# Patient Record
Sex: FEMALE | Race: WHITE | Employment: OTHER | URBAN - METROPOLITAN AREA
[De-identification: names, ages, dates, MRNs, and addresses within clinical notes are randomized per-mention and may not be internally consistent; named-entity substitution may affect disease eponyms.]

---

## 2017-01-19 ENCOUNTER — TRANSFERRED RECORDS (OUTPATIENT)
Dept: HEALTH INFORMATION MANAGEMENT | Facility: CLINIC | Age: 47
End: 2017-01-19

## 2017-01-19 LAB — PHQ9 SCORE: 0

## 2017-03-21 ENCOUNTER — TRANSFERRED RECORDS (OUTPATIENT)
Dept: HEALTH INFORMATION MANAGEMENT | Facility: CLINIC | Age: 47
End: 2017-03-21

## 2017-05-18 ENCOUNTER — TRANSFERRED RECORDS (OUTPATIENT)
Dept: HEALTH INFORMATION MANAGEMENT | Facility: CLINIC | Age: 47
End: 2017-05-18

## 2017-05-18 LAB — PHQ9 SCORE: 0

## 2017-06-17 ENCOUNTER — HEALTH MAINTENANCE LETTER (OUTPATIENT)
Age: 47
End: 2017-06-17

## 2017-07-18 ENCOUNTER — TRANSFERRED RECORDS (OUTPATIENT)
Dept: HEALTH INFORMATION MANAGEMENT | Facility: CLINIC | Age: 47
End: 2017-07-18

## 2017-07-31 ENCOUNTER — TELEPHONE (OUTPATIENT)
Dept: ONCOLOGY | Facility: CLINIC | Age: 47
End: 2017-07-31

## 2017-07-31 NOTE — TELEPHONE ENCOUNTER
Left a msg with Ella to call clinic and schedule f/u appointment with Nkechi Green and then a Mammogram appointment to follow.  Janel ALCARAZ

## 2017-09-19 ENCOUNTER — TRANSFERRED RECORDS (OUTPATIENT)
Dept: HEALTH INFORMATION MANAGEMENT | Facility: CLINIC | Age: 47
End: 2017-09-19

## 2017-09-19 LAB — PHQ9 SCORE: 7

## 2017-10-09 DIAGNOSIS — R61 HYPERHIDROSIS: ICD-10-CM

## 2017-10-09 NOTE — TELEPHONE ENCOUNTER
glycopyrrolate 2 MG TABS      Last Written Prescription Date: 09/22/16  Last Fill Quantity: 90,  # refills: 11   Last Office Visit with G, [facility] or [facility] prescribing provider: 09/22/16

## 2017-10-10 RX ORDER — GLYCOPYRROLATE 2 MG/1
2-3 TABLET ORAL 2 TIMES DAILY PRN
Qty: 90 TABLET | Refills: 11 | Status: SHIPPED | OUTPATIENT
Start: 2017-10-10 | End: 2018-09-28

## 2017-10-10 NOTE — TELEPHONE ENCOUNTER
Routing refill request to provider for review/approval because:  Exceeds max dosing, please sign if ok.  Alona Funes, RN  Triage Nurse

## 2017-11-29 ENCOUNTER — OFFICE VISIT (OUTPATIENT)
Dept: PEDIATRICS | Facility: CLINIC | Age: 47
End: 2017-11-29
Payer: COMMERCIAL

## 2017-11-29 ENCOUNTER — TELEPHONE (OUTPATIENT)
Dept: PEDIATRICS | Facility: CLINIC | Age: 47
End: 2017-11-29

## 2017-11-29 VITALS
OXYGEN SATURATION: 98 % | HEART RATE: 92 BPM | HEIGHT: 64 IN | BODY MASS INDEX: 26.8 KG/M2 | WEIGHT: 156.97 LBS | SYSTOLIC BLOOD PRESSURE: 112 MMHG | TEMPERATURE: 98.3 F | DIASTOLIC BLOOD PRESSURE: 78 MMHG

## 2017-11-29 DIAGNOSIS — N10 ACUTE PYELONEPHRITIS: Primary | ICD-10-CM

## 2017-11-29 DIAGNOSIS — R39.9 SYMPTOMS INVOLVING URINARY SYSTEM: Primary | ICD-10-CM

## 2017-11-29 DIAGNOSIS — R10.31 ABDOMINAL PAIN, RIGHT LOWER QUADRANT: ICD-10-CM

## 2017-11-29 DIAGNOSIS — F32.1 MODERATE MAJOR DEPRESSION (H): ICD-10-CM

## 2017-11-29 DIAGNOSIS — R30.0 DYSURIA: ICD-10-CM

## 2017-11-29 DIAGNOSIS — R82.90 NONSPECIFIC FINDING ON EXAMINATION OF URINE: ICD-10-CM

## 2017-11-29 LAB
ALBUMIN UR-MCNC: NEGATIVE MG/DL
APPEARANCE UR: CLEAR
BACTERIA #/AREA URNS HPF: ABNORMAL /HPF
BILIRUB UR QL STRIP: NEGATIVE
COLOR UR AUTO: YELLOW
GLUCOSE UR STRIP-MCNC: NEGATIVE MG/DL
HGB UR QL STRIP: ABNORMAL
KETONES UR STRIP-MCNC: NEGATIVE MG/DL
LEUKOCYTE ESTERASE UR QL STRIP: ABNORMAL
MUCOUS THREADS #/AREA URNS LPF: PRESENT /LPF
NITRATE UR QL: NEGATIVE
NON-SQ EPI CELLS #/AREA URNS LPF: ABNORMAL /LPF
PH UR STRIP: 6 PH (ref 5–7)
RBC #/AREA URNS AUTO: ABNORMAL /HPF
SOURCE: ABNORMAL
SP GR UR STRIP: 1.02 (ref 1–1.03)
UROBILINOGEN UR STRIP-ACNC: 0.2 EU/DL (ref 0.2–1)
WBC #/AREA URNS AUTO: ABNORMAL /HPF

## 2017-11-29 PROCEDURE — 87086 URINE CULTURE/COLONY COUNT: CPT | Performed by: INTERNAL MEDICINE

## 2017-11-29 PROCEDURE — 81001 URINALYSIS AUTO W/SCOPE: CPT | Performed by: INTERNAL MEDICINE

## 2017-11-29 PROCEDURE — 99214 OFFICE O/P EST MOD 30 MIN: CPT | Performed by: INTERNAL MEDICINE

## 2017-11-29 RX ORDER — SULFAMETHOXAZOLE/TRIMETHOPRIM 800-160 MG
1 TABLET ORAL 2 TIMES DAILY
Qty: 14 TABLET | Refills: 0 | Status: SHIPPED | OUTPATIENT
Start: 2017-11-29 | End: 2017-12-06

## 2017-11-29 NOTE — LETTER
My Depression Action Plan  Name: Ella Reid   Date of Birth 1970  Date: 11/29/2017    My doctor: Irma Davies   My clinic: [address]  [address]  [facility] [address] [phone]          GREEN    ZONE   Good Control    What it looks like:     Things are going generally well. You have normal up s and down s. You may even feel depressed from time to time, but bad moods usually last less than a day.   What you need to do:  1. Continue to care for yourself (see self care plan)  2. Check your depression survival kit and update it as needed  3. Follow your physician s recommendations including any medication.  4. Do not stop taking medication unless you consult with your physician first.           YELLOW         ZONE Getting Worse    What it looks like:     Depression is starting to interfere with your life.     It may be hard to get out of bed; you may be starting to isolate yourself from others.    Symptoms of depression are starting to last most all day and this has happened for several days.     You may have suicidal thoughts but they are not constant.   What you need to do:     1. Call your care team, your response to treatment will improve if you keep your care team informed of your progress. Yellow periods are signs an adjustment may need to be made.     2. Continue your self-care, even if you have to fake it!    3. Talk to someone in your support network    4. Open up your depression survival kit           RED    ZONE Medical Alert - Get Help    What it looks like:     Depression is seriously interfering with your life.     You may experience these or other symptoms: You can t get out of bed most days, can t work or engage in other necessary activities, you have trouble taking care of basic hygiene, or basic responsibilities, thoughts of suicide or death that will not go away, self-injurious behavior.     What you need to do:  1. Call your  care team and request a same-day appointment. If they are not available (weekends or after hours) call your local crisis line, emergency room or 911.      Electronically signed by: Nay Pond, November 29, 2017    Depression Self Care Plan / Survival Kit    Self-Care for Depression  Here s the deal. Your body and mind are really not as separate as most people think.  What you do and think affects how you feel and how you feel influences what you do and think. This means if you do things that people who feel good do, it will help you feel better.  Sometimes this is all it takes.  There is also a place for medication and therapy depending on how severe your depression is, so be sure to consult with your medical provider and/ or Behavioral Health Consultant if your symptoms are worsening or not improving.     In order to better manage my stress, I will:    Exercise  Get some form of exercise, every day. This will help reduce pain and release endorphins, the  feel good  chemicals in your brain. This is almost as good as taking antidepressants!  This is not the same as joining a gym and then never going! (they count on that by the way ) It can be as simple as just going for a walk or doing some gardening, anything that will get you moving.      Hygiene   Maintain good hygiene (Get out of bed in the morning, Make your bed, Brush your teeth, Take a shower, and Get dressed like you were going to work, even if you are unemployed).  If your clothes don't fit try to get ones that do.    Diet  I will strive to eat foods that are good for me, drink plenty of water, and avoid excessive sugar, caffeine, alcohol, and other mood-altering substances.  Some foods that are helpful in depression are: complex carbohydrates, B vitamins, flaxseed, fish or fish oil, fresh fruits and vegetables.    Psychotherapy  I agree to participate in Individual Therapy (if recommended).    Medication  If prescribed medications, I agree to take  them.  Missing doses can result in serious side effects.  I understand that drinking alcohol, or other illicit drug use, may cause potential side effects.  I will not stop my medication abruptly without first discussing it with my provider.    Staying Connected With Others  I will stay in touch with my friends, family members, and my primary care provider/team.    Use your imagination  Be creative.  We all have a creative side; it doesn t matter if it s oil painting, sand castles, or mud pies! This will also kick up the endorphins.    Witness Beauty  (AKA stop and smell the roses) Take a look outside, even in mid-winter. Notice colors, textures. Watch the squirrels and birds.     Service to others  Be of service to others.  There is always someone else in need.  By helping others we can  get out of ourselves  and remember the really important things.  This also provides opportunities for practicing all the other parts of the program.    Humor  Laugh and be silly!  Adjust your TV habits for less news and crime-drama and more comedy.    Control your stress  Try breathing deep, massage therapy, biofeedback, and meditation. Find time to relax each day.     My support system    Clinic Contact:  Phone number:    Contact 1:  Phone number:    Contact 2:  Phone number:    Mandaen/:  Phone number:    Therapist:  Phone number:    Local crisis center:    Phone number:    Other community support:  Phone number:

## 2017-11-29 NOTE — MR AVS SNAPSHOT
"              After Visit Summary   11/29/2017    Ella Reid    MRN: [id number]           Patient Information     Date Of Birth          1970        Visit Information        Provider Department      11/29/2017 10:20 AM Paulina Stanley MD [facility]        Today's Diagnoses     Acute pyelonephritis    -  1    Abdominal pain, right lower quadrant        Moderate major depression (H)        Dysuria        Nonspecific finding on examination of urine           Follow-ups after your visit        Who to contact     If you have questions or need follow up information about today's clinic visit or your schedule please contact [facility] directly at [phone].  Normal or non-critical lab and imaging results will be communicated to you by MyChart, letter or phone within 4 business days after the clinic has received the results. If you do not hear from us within 7 days, please contact the clinic through [employer] or phone. If you have a critical or abnormal lab result, we will notify you by phone as soon as possible.  Submit refill requests through [employer] or call your pharmacy and they will forward the refill request to us. Please allow 3 business days for your refill to be completed.          Additional Information About Your Visit        MyChart Information     [employer] gives you secure access to your electronic health record. If you see a primary care provider, you can also send messages to your care team and make appointments. If you have questions, please call your primary care clinic.  If you do not have a primary care provider, please call [phone] and they will assist you.        Care EveryWhere ID     This is your Care EveryWhere ID. This could be used by other organizations to access your [facility] medical records  [id number]        Your Vitals Were     Pulse Temperature Height Last Period Pulse Oximetry BMI (Body Mass Index)    92 98.3  F (36.8  C) (Tympanic) 5' 4\" " (1.626 m) 07/28/2008 98% 26.94 kg/m2       Blood Pressure from Last 3 Encounters:   11/29/17 112/78   12/21/16 111/65   09/22/16 100/56    Weight from Last 3 Encounters:   11/29/17 156 lb 15.5 oz (71.2 kg)   12/21/16 141 lb (64 kg)   09/22/16 137 lb (62.1 kg)              We Performed the Following     *UA reflex to Microscopic and Culture ([place] [facility] (except [place] and [place] [employer])     DEPRESSION ACTION PLAN (DAP)     Urine Culture Aerobic Bacterial     Urine Microscopic        Primary Care Provider Office Phone # Fax #    Irma Davies [phone] [address] [place] [address]        Equal Access to Services     [facility] DAMI : Ene myers Solindsay, waaxda luqadaha, qaybta kaalmada adeegyavirginia, moose rodriguez . So [facility] [phone].    ATENCIÓN: Si habla español, tiene a cantu disposición servicios gratuitos de asistencia lingüística. Llame al [phone].    We comply with applicable federal civil rights laws and [place] laws. We do not discriminate on the basis of race, color, national origin, age, disability, sex, sexual orientation, or gender identity.            Thank you!     Thank you for choosing [facility]  for your care. Our goal is always to provide you with excellent care. Hearing back from our patients is one way we can continue to improve our services. Please take a few minutes to complete the written survey that you may receive in the mail after your visit with us. Thank you!             Your Updated Medication List - Protect others around you: Learn how to safely use, store and throw away your medicines at www.disposemymeds.org.          This list is accurate as of: 11/29/17 11:00 AM.  Always use your most recent med list.                   Brand Name Dispense Instructions for use Diagnosis    AMITIZA 24 MCG capsule   Generic drug:  lubiprostone      TAKE 1 CAPSULE BY ORAL ROUTE 2 TIMES EVERY DAY  WITH FOOD AND WATER        glycopyrrolate 2 MG Tabs     90 tablet    Take 2-3 mg by mouth 2 times daily as needed    Hyperhidrosis       HYDROcodone-acetaminophen  MG per tablet    NORCO     Take 1-2 tablets by mouth every 6 hours as needed (max 6 tabs/day)        magnesium hydroxide 400 MG/5ML suspension    MILK OF MAGNESIA    360 mL    Take 30-60 mLs by mouth daily as needed for constipation or heartburn        metaxalone 800 MG tablet    SKELAXIN     Take 800 mg by mouth 4 times daily as needed        * order for DME     1 Device    Equipment being ordered: wrist / thumb LT; [id number]    Wrist pain, left       * order for DME     1 Device    Equipment being ordered: umang mesa MD, Prostep; [id number]    Knee pain, left       OXYCONTIN 30 MG 12 hr tablet   Generic drug:  oxyCODONE      TAKE 1 TABLET BY ORAL ROUTE EVERY 12 HOURS -MAY FILL 4/24/16        senna-docusate 8.6-50 MG per tablet    SENOKOT-S;PERICOLACE    60 tablet    Take 1 tablet by mouth 2 times daily        TOPAMAX 50 MG tablet   Generic drug:  topiramate      Take 50 mg by mouth 2 times daily        traZODone 50 MG tablet    DESYREL    90 tablet    Take  mg by mouth nightly as needed        * Notice:  This list has 2 medication(s) that are the same as other medications prescribed for you. Read the directions carefully, and ask your doctor or other care provider to review them with you.

## 2017-11-29 NOTE — PROGRESS NOTES
"  SUBJECTIVE:   Ella Reid is a 47 year old female who presents to clinic today for the following health issues:    Patient reports to the clinic for the complaint of dysuria, frequency, urgency and odor. Sx started about 1 week ago; a few days ago she developed dry heaving, loss of appetite, increased back pain and \"feeling crummy\". She endorses low grade fevers 99.6; hot/ cold flashes and soaking sweats, abdominal pain, diarrhea every time she passes a BM; about 4-5 BM a day; sx started before last weekend. Denies hematozemia, black tar like stools. She has not been in contact with anyone sick and has not traveled outside of the country. Pt does have chronic back pain but feels that pain is worse and has been higher on her back than her typical pain.        URINARY TRACT SYMPTOMS      Duration: one week     Description  dysuria, frequency, urgency and odor    Intensity:  moderate    Accompanying signs and symptoms:  Fever/chills: no   Flank pain YES- with hx of back pain   Nausea and vomiting: YES  Vaginal symptoms: none  Abdominal/Pelvic Pain: YES    History  History of frequent UTI's: no   History of kidney stones: no   Sexually Active: YES  Possibility of pregnancy: No    Precipitating or alleviating factors: None    Therapies tried and outcome: none   Outcome: N/A            Problem list and histories reviewed & adjusted, as indicated.  Additional history: as documented    Patient Active Problem List   Diagnosis     Personal History of Anorexia Nervosa     CARDIOVASCULAR SCREENING; LDL GOAL LESS THAN 160     Basal cell carcinoma     Hyperhidrosis     GERD (gastroesophageal reflux disease)     Back pain     Moderate major depression (H)     Lump of breast     Pain in limb     Other complications due to other internal orthopedic device, implant, and graft     Lump or mass in breast     Dyspepsia and other specified disorders of function of stomach     Other specified dermatoses     Ovarian cyst, right     " Family history of malignant neoplasm of breast     Past Surgical History:   Procedure Laterality Date     CARPAL TUNNEL RELEASE RT/LT  , 10/09     CERVICECTOMY (TRACHELECTOMY)  6/15/11     HERNIA REPAIR      umbilical hernia     HYSTERECTOMY SUPRACERVICAL       HYSTERECTOMY, PAP STILL INDICATED      LSH; supracervical hysterectomy      INJECT STEROID (LOCATION) Bilateral 3/30/2015    Procedure: INJECT STEROID (LOCATION);  Surgeon: Laura Jasso DPM, Pod;  Location: [facility] OR     INSERT STIMULATOR AND LEADS INTERNAL DORSAL COLUMN N/A 10/13/2016    Procedure: INSERT STIMULATOR AND LEADS INTERNAL DORSAL COLUMN;  Surgeon: Del Yuen MD;  Location: [facility] OR     REMOVE HARDWARE FOOT Right 3/30/2015    Procedure: REMOVE HARDWARE FOOT;  Surgeon: Laura Jasso DPM, Pod;  Location: [facility] OR       Social History   Substance Use Topics     Smoking status: Former Smoker     Packs/day: 1.50     Years: 8.00     Quit date: 2000     Smokeless tobacco: Never Used     Alcohol use 0.0 oz/week     0 Standard drinks or equivalent per week      Comment: 1 glass wine per week     Family History   Problem Relation Age of Onset     Adopted: Yes     Depression Mother      CANCER Mother      age at diagnosis and type is unknown     Depression Father      Breast Cancer Maternal Grandmother 40      in 50s     DIABETES Maternal Grandfather      Depression Maternal Aunt      Breast Cancer Maternal Aunt 40      in 40s of unrelated causes         Current Outpatient Prescriptions   Medication Sig Dispense Refill     glycopyrrolate 2 MG TABS Take 2-3 mg by mouth 2 times daily as needed 90 tablet 11     AMITIZA 24 MCG capsule TAKE 1 CAPSULE BY ORAL ROUTE 2 TIMES EVERY DAY WITH FOOD AND WATER  1     OXYCONTIN 30 MG 12 hr tablet TAKE 1 TABLET BY ORAL ROUTE EVERY 12 HOURS -MAY FILL 16  0     HYDROcodone-acetaminophen (NORCO)  MG per tablet Take 1-2 tablets by mouth every 6 hours as needed (max 6 tabs/day)       topiramate  (TOPAMAX) 50 MG tablet Take 50 mg by mouth 2 times daily       senna-docusate (SENOKOT-S;PERICOLACE) 8.6-50 MG per tablet Take 1 tablet by mouth 2 times daily 60 tablet 1     magnesium hydroxide (MILK OF MAGNESIA) 400 MG/5ML suspension Take 30-60 mLs by mouth daily as needed for constipation or heartburn 360 mL 1     traZODone (DESYREL) 50 MG tablet Take  mg by mouth nightly as needed  90 tablet 3     metaxalone (SKELAXIN) 800 MG tablet Take 800 mg by mouth 4 times daily as needed   1     ORDER FOR DME Equipment being ordered: wrist / thumb LT; [id number] 1 Device 0     ORDER FOR DME Equipment being ordered: umang mesa MD, Prostep; [id number] 1 Device 0     Allergies   Allergen Reactions     Levaquin Swelling     Wrist tendons and achilles tendon     Cimetidine      loss of muscle control face and fingers-tagamet     Erythromycin      vomiting     Morphine      migraines     Tagamet      Loss of muscle control face and fingers     BP Readings from Last 3 Encounters:   11/29/17 112/78   12/21/16 111/65   09/22/16 100/56    Wt Readings from Last 3 Encounters:   11/29/17 71.2 kg (156 lb 15.5 oz)   12/21/16 64 kg (141 lb)   09/22/16 62.1 kg (137 lb)                        Reviewed and updated as needed this visit by clinical staffTobacco  Allergies  Meds  Med Hx  Surg Hx  Fam Hx  Soc Hx      Reviewed and updated as needed this visit by Provider         ROS:  Constitutional, HEENT, cardiovascular, pulmonary, gi and gu systems are negative, except as otherwise noted.    SEE HPI ABOVE     This document serves as a record of the services and decisions personally performed and made by Paulina Stanley MD. It was created on her behalf by Radha Chamberlain, a trained medical scribe. The creation of this document is based the provider's statements to the medical scribe.    Radha Chamberlain November 29, 2017 10:43 AM  OBJECTIVE:   /78 (BP Location: Right arm, Patient Position: Sitting, Cuff Size: Adult  "Regular)  Pulse 92  Temp 98.3  F (36.8  C) (Tympanic)  Ht 1.626 m (5' 4\")  Wt 71.2 kg (156 lb 15.5 oz)  LMP 07/28/2008  SpO2 98%  BMI 26.94 kg/m2  Body mass index is 26.94 kg/(m^2).  GENERAL: alert and no distress  RESP: lungs clear to auscultation - no rales, rhonchi or wheezes  CV: regular rate and rhythm, normal S1 S2, no S3 or S4, no murmur, click or rub  ABDOMEN: BS+ soft.  Tender RLQ, RUQ, no rebound or guarding.    BACK: + CVA tenderness bilaterally.    PSYCH: mentation appears normal, affect normal/bright      Diagnostic Test Results:  Results for orders placed or performed in visit on 11/29/17 (from the past 24 hour(s))   *UA reflex to Microscopic and Culture ([place] [facility] (except [place] and [place])   Result Value Ref Range    Color Urine Yellow     Appearance Urine Clear     Glucose Urine Negative NEG^Negative mg/dL    Bilirubin Urine Negative NEG^Negative    Ketones Urine Negative NEG^Negative mg/dL    Specific Gravity Urine 1.020 1.003 - 1.035    Blood Urine Trace (A) NEG^Negative    pH Urine 6.0 5.0 - 7.0 pH    Protein Albumin Urine Negative NEG^Negative mg/dL    Urobilinogen Urine 0.2 0.2 - 1.0 EU/dL    Nitrite Urine Negative NEG^Negative    Leukocyte Esterase Urine Large (A) NEG^Negative    Source Midstream Urine    Urine Microscopic   Result Value Ref Range    WBC Urine  (A) OTO2^O - 2 /HPF    RBC Urine 2-5 (A) OTO2^O - 2 /HPF    Squamous Epithelial /LPF Urine Many (A) FEW^Few /LPF    Bacteria Urine Many (A) NEG^Negative /HPF    Mucous Urine Present (A) NEG^Negative /LPF       ASSESSMENT/PLAN:   (N10) Acute pyelonephritis  (primary encounter diagnosis)  -UA +, with back pain and sweats I'm concerned this is pyelo  -will treat with bactrim BID x 7 days; await culture results   -if not improving in next 1-2 days should be seen again  -if worsening (fever greater than 100.5, worsening pain) or cannot keep down medications will need to go to the ER      (R10.31) Abdominal " pain, right lower quadrant  -discussed with patient that it is possible that she has more than one issue causing symptoms  -if worsening would recommend she go to the ER for imaging  Plan: Urine Microscopic        (F32.1) Moderate major depression (H)  Plan: DEPRESSION ACTION PLAN (DAP)       (R30.0) Dysuria  Plan: *UA reflex to Microscopic and Culture ([place]         [facility] (except [place] and         [place])      (R82.90) Nonspecific finding on examination of urine  Plan: Urine Culture Aerobic Bacterial        Follow up if symptoms are not improving or worsening       The information in this document, created by the medical scribe for me, accurately reflects the services I personally performed and the decisions made by me. I have reviewed and approved this document for accuracy prior to leaving the patient care area.  Paulina Stanley MD  [facility]

## 2017-11-29 NOTE — TELEPHONE ENCOUNTER
"Patient calls.  At the pharmacy-asking if medication was sent for her.    Per office visit today: \"will treat with bactrim BID x 7 days; await culture results\"    Huddled with pantera Weathers to send.  Yoko Jordan RN  Message handled by Nurse Triage.    "

## 2017-11-30 LAB
BACTERIA SPEC CULT: NORMAL
SPECIMEN SOURCE: NORMAL

## 2018-03-15 ENCOUNTER — TRANSFERRED RECORDS (OUTPATIENT)
Dept: HEALTH INFORMATION MANAGEMENT | Facility: CLINIC | Age: 48
End: 2018-03-15

## 2018-05-15 ENCOUNTER — TRANSFERRED RECORDS (OUTPATIENT)
Dept: HEALTH INFORMATION MANAGEMENT | Facility: CLINIC | Age: 48
End: 2018-05-15

## 2018-05-15 LAB — PHQ9 SCORE: 0

## 2018-05-25 ENCOUNTER — TELEPHONE (OUTPATIENT)
Dept: PEDIATRICS | Facility: CLINIC | Age: 48
End: 2018-05-25

## 2018-05-25 NOTE — LETTER
July 26, 2018      Ella Reid  [address] [place] [address]        Dear Ella,       We care about your health and have reviewed your health plan including your medical conditions, medications, and lab results.  Based on this review, it is recommended that you follow up regarding the following health topic(s):  -Depression  -Cervical Cancer Screening    We recommend you take the following action(s):  -schedule a PAP SMEAR EXAM which is due.  Please disregard this reminder if you have had this exam elsewhere within the last year.  It would be helpful for us to have a copy of your recent pap smear report to update your records.  -Complete and return the attached PHQ-9 Form.  If your total score is greater than 9, please schedule a followup appointment.  If you answer Yes to question 9, call your clinic between the hours of 8 to 5.  You may also call the Suicide Hotline at [phone] ([phone]) any time.     Please call us at the [facility] - [phone] (or use [employer]) to address the above recommendations.     Thank you for trusting [facility] and we appreciate the opportunity to serve you.  We look forward to supporting your healthcare needs in the future.    Healthy Regards,    Your Health Care Team  [facility]           none

## 2018-05-25 NOTE — TELEPHONE ENCOUNTER
Panel Management Review      Patient has the following on her problem list:     Depression / Dysthymia review    Measure:  Needs PHQ-9 score of 4 or less during index window.  Administer PHQ-9 and if score is 5 or more, send encounter to provider for next steps.    5 - 7 month window range: 0    PHQ-9 SCORE 9/10/2013 3/26/2015 9/22/2016   Total Score 0 0 -   Total Score - - 0       If PHQ-9 recheck is 5 or more, route to provider for next steps.    Patient is due for:  PHQ9      Composite cancer screening  Chart review shows that this patient is due/due soon for the following Pap Smear  Summary:    Patient is due/failing the following:   PAP and PHQ9    Action needed:   Patient needs office visit for pap smear. and Patient needs to do PHQ9.    Type of outreach:    Sent [employer] message.    Questions for provider review:    None                                                                                                                                    Nay Pond CMA ([facility])        Chart routed to Care Team .

## 2018-07-12 ENCOUNTER — TRANSFERRED RECORDS (OUTPATIENT)
Dept: HEALTH INFORMATION MANAGEMENT | Facility: CLINIC | Age: 48
End: 2018-07-12

## 2018-07-12 LAB — PHQ9 SCORE: 0

## 2018-07-26 NOTE — TELEPHONE ENCOUNTER
Sent letter to pt to schedule with PHQ-9.     Nay Pond CMA ([facility])

## 2018-11-08 ENCOUNTER — TRANSFERRED RECORDS (OUTPATIENT)
Dept: HEALTH INFORMATION MANAGEMENT | Facility: CLINIC | Age: 48
End: 2018-11-08

## 2018-11-08 LAB — PHQ9 SCORE: 0

## 2019-07-31 ENCOUNTER — TRANSFERRED RECORDS (OUTPATIENT)
Dept: HEALTH INFORMATION MANAGEMENT | Facility: CLINIC | Age: 49
End: 2019-07-31

## 2019-07-31 LAB — PHQ9 SCORE: 0

## 2019-11-10 ENCOUNTER — APPOINTMENT (OUTPATIENT)
Dept: GENERAL RADIOLOGY | Facility: CLINIC | Age: 49
End: 2019-11-10
Attending: NURSE PRACTITIONER

## 2019-11-10 ENCOUNTER — APPOINTMENT (OUTPATIENT)
Dept: CT IMAGING | Facility: CLINIC | Age: 49
End: 2019-11-10
Attending: NURSE PRACTITIONER

## 2019-11-10 ENCOUNTER — HOSPITAL ENCOUNTER (EMERGENCY)
Facility: CLINIC | Age: 49
Discharge: HOME | End: 2019-11-10
Attending: EMERGENCY MEDICINE | Admitting: EMERGENCY MEDICINE

## 2019-11-10 VITALS
OXYGEN SATURATION: 98 % | HEART RATE: 80 BPM | RESPIRATION RATE: 18 BRPM | TEMPERATURE: 99.2 F | DIASTOLIC BLOOD PRESSURE: 76 MMHG | SYSTOLIC BLOOD PRESSURE: 117 MMHG
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
## 2019-11-10 PROCEDURE — 25000132 ZZH RX MED GY IP 250 OP 250 PS 637: Performed by: EMERGENCY MEDICINE

## 2019-11-10 PROCEDURE — 25000132 ZZH RX MED GY IP 250 OP 250 PS 637: Performed by: NURSE PRACTITIONER

## 2019-11-10 PROCEDURE — 72125 CT NECK SPINE W/O DYE: CPT

## 2019-11-10 PROCEDURE — 72100 X-RAY EXAM L-S SPINE 2/3 VWS: CPT

## 2019-11-10 PROCEDURE — 72072 X-RAY EXAM THORAC SPINE 3VWS: CPT

## 2019-11-10 PROCEDURE — 99285 EMERGENCY DEPT VISIT HI MDM: CPT | Mod: 25

## 2019-11-10 RX ORDER — IBUPROFEN 600 MG/1
600 TABLET, FILM COATED ORAL ONCE
Status: COMPLETED | OUTPATIENT
Start: 2019-11-10 | End: 2019-11-10

## 2019-11-10 RX ORDER — HYDROCODONE BITARTRATE AND ACETAMINOPHEN 5; 325 MG/1; MG/1
2 TABLET ORAL ONCE
Status: COMPLETED | OUTPATIENT
Start: 2019-11-10 | End: 2019-11-10

## 2019-11-10 RX ORDER — DIAZEPAM 5 MG
5 TABLET ORAL ONCE
Status: COMPLETED | OUTPATIENT
Start: 2019-11-10 | End: 2019-11-10

## 2019-11-10 RX ORDER — IBUPROFEN 600 MG/1
600 TABLET, FILM COATED ORAL EVERY 6 HOURS PRN
Qty: 28 TABLET | Refills: 0 | Status: SHIPPED | OUTPATIENT
Start: 2019-11-10 | End: 2019-11-17

## 2019-11-10 RX ADMIN — DIAZEPAM 5 MG: 5 TABLET ORAL at 21:08

## 2019-11-10 RX ADMIN — HYDROCODONE BITARTRATE AND ACETAMINOPHEN 2 TABLET: 5; 325 TABLET ORAL at 18:33

## 2019-11-10 RX ADMIN — IBUPROFEN 600 MG: 600 TABLET, FILM COATED ORAL at 21:07

## 2019-11-10 ASSESSMENT — ENCOUNTER SYMPTOMS
HEADACHES: 1
NECK PAIN: 1
BACK PAIN: 1
ABDOMINAL PAIN: 0
SHORTNESS OF BREATH: 0
PALPITATIONS: 0
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
## 2019-11-10 NOTE — ED AVS SNAPSHOT
[facility] Emergency Department  [address]  [facility] [address]  Phone:  [phone]  Fax:  [phone]                                    Ella Reid   MRN: [id number]    Department:  [facility] Emergency Department   Date of Visit:  11/10/2019           After Visit Summary Signature Page    I have received my discharge instructions, and my questions have been answered. I have discussed any challenges I see with this plan with the nurse or doctor.    ..........................................................................................................................................  Patient/Patient Representative Signature      ..........................................................................................................................................  Patient Representative Print Name and Relationship to Patient    ..................................................               ................................................  Date                                   Time    ..........................................................................................................................................  Reviewed by Signature/Title    ...................................................              ..............................................  Date                                               Time          22EPIC Rev 08/18
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
## 2019-11-11 NOTE — DISCHARGE INSTRUCTIONS
Use ice on sore areas for the first 24 to 48 hours.  After 48 hours and use heat if you find it helpful.  Take ibuprofen every 6 hours with food.
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
## 2019-12-15 ENCOUNTER — HEALTH MAINTENANCE LETTER (OUTPATIENT)
Age: 49
End: 2019-12-15

## 2020-12-15 ENCOUNTER — TRANSFERRED RECORDS (OUTPATIENT)
Dept: HEALTH INFORMATION MANAGEMENT | Facility: CLINIC | Age: 50
End: 2020-12-15

## 2021-01-13 ENCOUNTER — TRANSFERRED RECORDS (OUTPATIENT)
Dept: HEALTH INFORMATION MANAGEMENT | Facility: CLINIC | Age: 51
End: 2021-01-13

## 2021-03-31 ENCOUNTER — TRANSFERRED RECORDS (OUTPATIENT)
Dept: HEALTH INFORMATION MANAGEMENT | Facility: CLINIC | Age: 51
End: 2021-03-31

## 2021-04-30 ENCOUNTER — TRANSFERRED RECORDS (OUTPATIENT)
Dept: HEALTH INFORMATION MANAGEMENT | Facility: CLINIC | Age: 51
End: 2021-04-30

## 2021-05-27 ENCOUNTER — TRANSFERRED RECORDS (OUTPATIENT)
Dept: HEALTH INFORMATION MANAGEMENT | Facility: CLINIC | Age: 51
End: 2021-05-27

## 2021-06-28 ENCOUNTER — TRANSFERRED RECORDS (OUTPATIENT)
Dept: HEALTH INFORMATION MANAGEMENT | Facility: CLINIC | Age: 51
End: 2021-06-28

## 2021-07-27 ENCOUNTER — TRANSFERRED RECORDS (OUTPATIENT)
Dept: HEALTH INFORMATION MANAGEMENT | Facility: CLINIC | Age: 51
End: 2021-07-27

## 2021-08-25 ENCOUNTER — TRANSFERRED RECORDS (OUTPATIENT)
Dept: HEALTH INFORMATION MANAGEMENT | Facility: CLINIC | Age: 51
End: 2021-08-25

## 2021-09-19 ENCOUNTER — TRANSFERRED RECORDS (OUTPATIENT)
Dept: HEALTH INFORMATION MANAGEMENT | Facility: CLINIC | Age: 51
End: 2021-09-19

## 2021-09-23 ENCOUNTER — TRANSFERRED RECORDS (OUTPATIENT)
Dept: HEALTH INFORMATION MANAGEMENT | Facility: CLINIC | Age: 51
End: 2021-09-23

## 2021-10-25 ENCOUNTER — TRANSFERRED RECORDS (OUTPATIENT)
Dept: HEALTH INFORMATION MANAGEMENT | Facility: CLINIC | Age: 51
End: 2021-10-25

## 2021-11-23 ENCOUNTER — TRANSFERRED RECORDS (OUTPATIENT)
Dept: HEALTH INFORMATION MANAGEMENT | Facility: CLINIC | Age: 51
End: 2021-11-23

## 2021-12-22 ENCOUNTER — TRANSFERRED RECORDS (OUTPATIENT)
Dept: HEALTH INFORMATION MANAGEMENT | Facility: CLINIC | Age: 51
End: 2021-12-22

## 2022-01-25 ENCOUNTER — TRANSFERRED RECORDS (OUTPATIENT)
Dept: HEALTH INFORMATION MANAGEMENT | Facility: CLINIC | Age: 52
End: 2022-01-25

## 2022-02-24 ENCOUNTER — TRANSFERRED RECORDS (OUTPATIENT)
Dept: HEALTH INFORMATION MANAGEMENT | Facility: CLINIC | Age: 52
End: 2022-02-24

## 2022-03-25 ENCOUNTER — TRANSFERRED RECORDS (OUTPATIENT)
Dept: HEALTH INFORMATION MANAGEMENT | Facility: CLINIC | Age: 52
End: 2022-03-25

## 2022-04-22 ENCOUNTER — TRANSFERRED RECORDS (OUTPATIENT)
Dept: HEALTH INFORMATION MANAGEMENT | Facility: CLINIC | Age: 52
End: 2022-04-22

## 2022-05-24 ENCOUNTER — TRANSFERRED RECORDS (OUTPATIENT)
Dept: HEALTH INFORMATION MANAGEMENT | Facility: CLINIC | Age: 52
End: 2022-05-24

## 2022-06-23 ENCOUNTER — TRANSFERRED RECORDS (OUTPATIENT)
Dept: HEALTH INFORMATION MANAGEMENT | Facility: CLINIC | Age: 52
End: 2022-06-23

## 2022-07-22 ENCOUNTER — TRANSFERRED RECORDS (OUTPATIENT)
Dept: HEALTH INFORMATION MANAGEMENT | Facility: CLINIC | Age: 52
End: 2022-07-22

## 2022-08-16 ENCOUNTER — TRANSFERRED RECORDS (OUTPATIENT)
Dept: HEALTH INFORMATION MANAGEMENT | Facility: CLINIC | Age: 52
End: 2022-08-16

## 2022-10-20 ENCOUNTER — TRANSFERRED RECORDS (OUTPATIENT)
Dept: HEALTH INFORMATION MANAGEMENT | Facility: CLINIC | Age: 52
End: 2022-10-20

## 2022-11-21 ENCOUNTER — TRANSFERRED RECORDS (OUTPATIENT)
Dept: HEALTH INFORMATION MANAGEMENT | Facility: CLINIC | Age: 52
End: 2022-11-21

## 2022-12-21 ENCOUNTER — TRANSFERRED RECORDS (OUTPATIENT)
Dept: HEALTH INFORMATION MANAGEMENT | Facility: CLINIC | Age: 52
End: 2022-12-21

## 2023-03-17 ENCOUNTER — TRANSFERRED RECORDS (OUTPATIENT)
Dept: HEALTH INFORMATION MANAGEMENT | Facility: CLINIC | Age: 53
End: 2023-03-17

## 2023-04-18 ENCOUNTER — TRANSFERRED RECORDS (OUTPATIENT)
Dept: HEALTH INFORMATION MANAGEMENT | Facility: CLINIC | Age: 53
End: 2023-04-18

## 2023-05-22 ENCOUNTER — TRANSFERRED RECORDS (OUTPATIENT)
Dept: HEALTH INFORMATION MANAGEMENT | Facility: CLINIC | Age: 53
End: 2023-05-22

## 2023-06-20 ENCOUNTER — TRANSFERRED RECORDS (OUTPATIENT)
Dept: HEALTH INFORMATION MANAGEMENT | Facility: CLINIC | Age: 53
End: 2023-06-20

## 2023-07-18 ENCOUNTER — TRANSFERRED RECORDS (OUTPATIENT)
Dept: HEALTH INFORMATION MANAGEMENT | Facility: CLINIC | Age: 53
End: 2023-07-18

## 2023-09-14 ENCOUNTER — TRANSFERRED RECORDS (OUTPATIENT)
Dept: HEALTH INFORMATION MANAGEMENT | Facility: CLINIC | Age: 53
End: 2023-09-14

## 2023-10-17 ENCOUNTER — TRANSFERRED RECORDS (OUTPATIENT)
Dept: HEALTH INFORMATION MANAGEMENT | Facility: CLINIC | Age: 53
End: 2023-10-17

## 2023-11-15 ENCOUNTER — TRANSFERRED RECORDS (OUTPATIENT)
Dept: HEALTH INFORMATION MANAGEMENT | Facility: CLINIC | Age: 53
End: 2023-11-15

## 2023-12-14 ENCOUNTER — TRANSFERRED RECORDS (OUTPATIENT)
Dept: HEALTH INFORMATION MANAGEMENT | Facility: CLINIC | Age: 53
End: 2023-12-14

## 2024-01-09 ENCOUNTER — TRANSFERRED RECORDS (OUTPATIENT)
Dept: HEALTH INFORMATION MANAGEMENT | Facility: CLINIC | Age: 54
End: 2024-01-09

## 2024-02-09 ENCOUNTER — TRANSFERRED RECORDS (OUTPATIENT)
Dept: HEALTH INFORMATION MANAGEMENT | Facility: CLINIC | Age: 54
End: 2024-02-09

## 2024-03-12 ENCOUNTER — TRANSFERRED RECORDS (OUTPATIENT)
Dept: HEALTH INFORMATION MANAGEMENT | Facility: CLINIC | Age: 54
End: 2024-03-12

## 2024-04-16 ENCOUNTER — TRANSFERRED RECORDS (OUTPATIENT)
Dept: HEALTH INFORMATION MANAGEMENT | Facility: CLINIC | Age: 54
End: 2024-04-16

## 2024-05-17 ENCOUNTER — TRANSFERRED RECORDS (OUTPATIENT)
Dept: HEALTH INFORMATION MANAGEMENT | Facility: CLINIC | Age: 54
End: 2024-05-17

## 2024-06-14 ENCOUNTER — TRANSFERRED RECORDS (OUTPATIENT)
Dept: HEALTH INFORMATION MANAGEMENT | Facility: CLINIC | Age: 54
End: 2024-06-14

## 2024-08-13 ENCOUNTER — TRANSFERRED RECORDS (OUTPATIENT)
Dept: HEALTH INFORMATION MANAGEMENT | Facility: CLINIC | Age: 54
End: 2024-08-13

## 2024-10-16 ENCOUNTER — TRANSFERRED RECORDS (OUTPATIENT)
Dept: HEALTH INFORMATION MANAGEMENT | Facility: CLINIC | Age: 54
End: 2024-10-16

## 2024-12-12 ENCOUNTER — TRANSFERRED RECORDS (OUTPATIENT)
Dept: HEALTH INFORMATION MANAGEMENT | Facility: CLINIC | Age: 54
End: 2024-12-12

## 2025-01-10 ENCOUNTER — TRANSFERRED RECORDS (OUTPATIENT)
Dept: HEALTH INFORMATION MANAGEMENT | Facility: CLINIC | Age: 55
End: 2025-01-10

## 2025-02-11 ENCOUNTER — TRANSFERRED RECORDS (OUTPATIENT)
Dept: HEALTH INFORMATION MANAGEMENT | Facility: CLINIC | Age: 55
End: 2025-02-11

## 2025-03-23 ENCOUNTER — ANCILLARY PROCEDURE (OUTPATIENT)
Dept: GENERAL RADIOLOGY | Facility: CLINIC | Age: 55
End: 2025-03-23
Attending: NURSE PRACTITIONER
Payer: COMMERCIAL

## 2025-03-23 ENCOUNTER — TRANSFERRED RECORDS (OUTPATIENT)
Dept: HEALTH INFORMATION MANAGEMENT | Facility: CLINIC | Age: 55
End: 2025-03-23

## 2025-03-23 ENCOUNTER — OFFICE VISIT (OUTPATIENT)
Dept: URGENT CARE | Facility: URGENT CARE | Age: 55
End: 2025-03-23
Payer: COMMERCIAL

## 2025-03-23 VITALS
SYSTOLIC BLOOD PRESSURE: 116 MMHG | BODY MASS INDEX: 27.64 KG/M2 | HEART RATE: 116 BPM | HEIGHT: 63 IN | WEIGHT: 156 LBS | OXYGEN SATURATION: 100 % | TEMPERATURE: 99 F | RESPIRATION RATE: 18 BRPM | DIASTOLIC BLOOD PRESSURE: 82 MMHG

## 2025-03-23 DIAGNOSIS — S82.839A DISPLACED FRACTURE OF DISTAL END OF FIBULA: ICD-10-CM

## 2025-03-23 DIAGNOSIS — M25.572 PAIN IN JOINT INVOLVING ANKLE AND FOOT, LEFT: Primary | ICD-10-CM

## 2025-03-23 DIAGNOSIS — M25.572 PAIN IN JOINT INVOLVING ANKLE AND FOOT, LEFT: ICD-10-CM

## 2025-03-23 PROCEDURE — 73610 X-RAY EXAM OF ANKLE: CPT | Mod: TC | Performed by: STUDENT IN AN ORGANIZED HEALTH CARE EDUCATION/TRAINING PROGRAM

## 2025-03-23 RX ORDER — IBUPROFEN 800 MG
TABLET ORAL
COMMUNITY

## 2025-03-23 RX ORDER — NAPROXEN 500 MG/1
TABLET, DELAYED RELEASE ORAL
COMMUNITY
Start: 2025-03-14

## 2025-03-23 RX ORDER — UBROGEPANT 100 MG/1
TABLET ORAL
COMMUNITY

## 2025-03-23 ASSESSMENT — PAIN SCALES - GENERAL: PAINLEVEL_OUTOF10: SEVERE PAIN (7)

## 2025-03-23 NOTE — PROGRESS NOTES
"  Assessment & Plan   Problem List Items Addressed This Visit    None  Visit Diagnoses       Pain in joint involving ankle and foot, left    -  Primary    Relevant Medications    naproxen (EC-NAPROSYN) 500 MG TBEC    Other Relevant Orders    XR Ankle Left G/E 3 Views (Completed)    Displaced fracture of distal end of fibula        Relevant Medications    naproxen (EC-NAPROSYN) 500 MG TBEC         Aircast is placed back on and patient is instructed to be seen by ortho today to discuss possible surgical intervention given displaced fracture.  Patient elected to go to [facility] and images have been pushed. Patient advised to continue to be nonweightbearing and use crutches as she has been .         BMI  Estimated body mass index is 27.63 kg/m  as calculated from the following:    Height as of this encounter: 1.6 m (5' 3\").    Weight as of this encounter: 70.8 kg (156 lb).           No follow-ups on file.      Kate Verdugo is a 54 year old, presenting for the following health issues:  Urgent Care (Left ankle injury , 12 am last night fell off a high top chair , ankle gave out. , thinks it was twisted. Starting to get headache and feels nauseated from pain ? Headache?)      3/23/2025    10:06 AM   Additional Questions   Roomed by Keli MORALES   Accompanied by self     HPI                Review of Systems  Constitutional, HEENT, cardiovascular, pulmonary, GI, , musculoskeletal, neuro, skin, endocrine and psych systems are negative, except as otherwise noted.      Objective    /82   Pulse 116   Temp 99  F (37.2  C) (Oral)   Resp 18   Ht 1.6 m (5' 3\")   Wt 70.8 kg (156 lb)   LMP 07/28/2008   SpO2 100%   BMI 27.63 kg/m    Body mass index is 27.63 kg/m .  Physical Exam   GENERAL: alert and no distress  EYES: Eyes grossly normal to inspection, conjunctivae and sclerae normal  RESP: respirations regular nonlabored   MS: LLE exam shows swelling and tenderness noted distal fibula, pulse intact, equal strength. "   PSYCH: mentation appears normal, affect normal/bright    Results for orders placed or performed in visit on 03/23/25   XR Ankle Left G/E 3 Views     Status: None    Narrative    EXAM: XR ANKLE LEFT G/E 3 VIEWS  LOCATION: [facility]  DATE: 3/23/2025    INDICATION:  Pain in joint involving ankle and foot, left  COMPARISON: None.      Impression    IMPRESSION: Acute mildly displaced oblique fracture of the distal fibula extending to the level of the ankle mortise. The ankle mortise is intact. Screw fixation of the first metatarsal neck. Mild soft tissue swelling in the ankle.           Signed Electronically by: Ella Mckinney NP

## 2025-03-28 ENCOUNTER — TRANSFERRED RECORDS (OUTPATIENT)
Dept: HEALTH INFORMATION MANAGEMENT | Facility: CLINIC | Age: 55
End: 2025-03-28
Payer: COMMERCIAL

## 2025-03-31 ENCOUNTER — OFFICE VISIT (OUTPATIENT)
Dept: PEDIATRICS | Facility: CLINIC | Age: 55
End: 2025-03-31
Payer: COMMERCIAL

## 2025-03-31 VITALS
HEIGHT: 63 IN | RESPIRATION RATE: 16 BRPM | HEART RATE: 95 BPM | TEMPERATURE: 97.3 F | WEIGHT: 149.9 LBS | SYSTOLIC BLOOD PRESSURE: 131 MMHG | BODY MASS INDEX: 26.56 KG/M2 | DIASTOLIC BLOOD PRESSURE: 84 MMHG | OXYGEN SATURATION: 98 %

## 2025-03-31 DIAGNOSIS — S82.62XA CLOSED DISPLACED FRACTURE OF LATERAL MALLEOLUS OF LEFT FIBULA, INITIAL ENCOUNTER: ICD-10-CM

## 2025-03-31 DIAGNOSIS — G89.4 CHRONIC PAIN SYNDROME: ICD-10-CM

## 2025-03-31 DIAGNOSIS — Z01.818 PRE-OP EXAM: Primary | ICD-10-CM

## 2025-03-31 DIAGNOSIS — Z98.890 PONV (POSTOPERATIVE NAUSEA AND VOMITING): ICD-10-CM

## 2025-03-31 DIAGNOSIS — M25.572 PAIN IN JOINT INVOLVING ANKLE AND FOOT, LEFT: ICD-10-CM

## 2025-03-31 DIAGNOSIS — R11.2 PONV (POSTOPERATIVE NAUSEA AND VOMITING): ICD-10-CM

## 2025-03-31 LAB
ANION GAP SERPL CALCULATED.3IONS-SCNC: 9 MMOL/L (ref 7–15)
BUN SERPL-MCNC: 11.1 MG/DL (ref 6–20)
CALCIUM SERPL-MCNC: 9.3 MG/DL (ref 8.8–10.4)
CHLORIDE SERPL-SCNC: 103 MMOL/L (ref 98–107)
CREAT SERPL-MCNC: 0.74 MG/DL (ref 0.51–0.95)
EGFRCR SERPLBLD CKD-EPI 2021: >90 ML/MIN/1.73M2
GLUCOSE SERPL-MCNC: 85 MG/DL (ref 70–99)
HCO3 SERPL-SCNC: 27 MMOL/L (ref 22–29)
HGB BLD-MCNC: 13.5 G/DL (ref 11.7–15.7)
POTASSIUM SERPL-SCNC: 3.9 MMOL/L (ref 3.4–5.3)
SODIUM SERPL-SCNC: 139 MMOL/L (ref 135–145)

## 2025-03-31 PROCEDURE — 3075F SYST BP GE 130 - 139MM HG: CPT | Performed by: NURSE PRACTITIONER

## 2025-03-31 PROCEDURE — 99214 OFFICE O/P EST MOD 30 MIN: CPT | Performed by: NURSE PRACTITIONER

## 2025-03-31 PROCEDURE — 3079F DIAST BP 80-89 MM HG: CPT | Performed by: NURSE PRACTITIONER

## 2025-03-31 PROCEDURE — 85018 HEMOGLOBIN: CPT | Performed by: NURSE PRACTITIONER

## 2025-03-31 PROCEDURE — 80048 BASIC METABOLIC PNL TOTAL CA: CPT | Performed by: NURSE PRACTITIONER

## 2025-03-31 PROCEDURE — 36415 COLL VENOUS BLD VENIPUNCTURE: CPT | Performed by: NURSE PRACTITIONER

## 2025-03-31 PROCEDURE — 1125F AMNT PAIN NOTED PAIN PRSNT: CPT | Performed by: NURSE PRACTITIONER

## 2025-03-31 ASSESSMENT — PATIENT HEALTH QUESTIONNAIRE - PHQ9
SUM OF ALL RESPONSES TO PHQ QUESTIONS 1-9: 0
10. IF YOU CHECKED OFF ANY PROBLEMS, HOW DIFFICULT HAVE THESE PROBLEMS MADE IT FOR YOU TO DO YOUR WORK, TAKE CARE OF THINGS AT HOME, OR GET ALONG WITH OTHER PEOPLE: NOT DIFFICULT AT ALL
SUM OF ALL RESPONSES TO PHQ QUESTIONS 1-9: 0

## 2025-03-31 ASSESSMENT — PAIN SCALES - GENERAL: PAINLEVEL_OUTOF10: SEVERE PAIN (7)

## 2025-03-31 NOTE — PATIENT INSTRUCTIONS
It was nice seeing you today.    Please let me know if you have any questions regarding today's visit!    Take care,    ALLISON Medina DNP  Family Medicine

## 2025-03-31 NOTE — PROGRESS NOTES
Preoperative Evaluation  [facility]  [address]  [address]  [place] [address]  Phone: [phone]  Fax: [phone]  Primary Provider: Physician No Ref-Primary  Pre-op Performing Provider: Olya Medina NP  Mar 31, 2025             3/31/2025   Surgical Information   What procedure is being done? surgery on broken ankle   [facility]ility or Hospital where procedure[facility]   Who is doing the procedure / surgery? dr beal   Date of surgery / procedure: 4-3-25   Time of surgery / procedure: 6am   Where do you plan to recover after surgery? at home with family     Fax number for surgical facility: [phone]  [facility]    Assessment & Plan     The proposed surgical procedure is considered INTERMEDIATE risk.    Pre-op exam  Cleared for procedure.  - Basic metabolic panel  (Ca, Cl, CO2, Creat, Gluc, K, Na, BUN)  - Hemoglobin    Closed displaced fracture of lateral malleolus of left fibula, initial encounter  Pain in joint involving ankle and foot, left  Stable. Followed by [facility].  Procedure 4/3/2025.    Chronic pain syndrome  Stable.  Followed by [facility].    PONV (postoperative nausea and vomiting)  Please note patient has a history of PONV     - No identified additional risk factors other than previously addressed    Antiplatelet or Anticoagulation Medication Instructions   - We reviewed the medication list and the patient is not on an antiplatelet or anticoagulation medications.    Additional Medication Instructions   - Opioids: Continue without modification.   [id number]}   - naproxen (Aleve, Naprosyn): DO NOT TAKE 4 days before surgery.     Recommendation  Approval given to proceed with proposed procedure, without further diagnostic evaluation.    Kate Verdugo is a 54 year old, presenting for the following:    Pre-Op Exam          3/31/2025     1:19 PM   Additional Questions   Roomed by BB VF   Accompanied  by Self         3/31/2025     1:19 PM   Patient Reported Additional Medications   Patient reports taking the following new medications Ubrelvy 100 Mg tablets - headaches     HPI: Left mildly displaced fracture of malleolus.  Fell off chair.          3/31/2025   Pre-Op Questionnaire   Have you ever had a heart attack or stroke? No   Have you ever had surgery on your heart or blood vessels, such as a stent placement, a coronary artery bypass, or surgery on an artery in your head, neck, heart, or legs? No   Do you have chest pain with activity? No   Do you have a history of heart failure? No   Do you currently have a cold, bronchitis or symptoms of other infection? No   Do you have a cough, shortness of breath, or wheezing? No   Do you or anyone in your family have previous history of blood clots? No   Do you or does anyone in your family have a serious bleeding problem such as prolonged bleeding following surgeries or cuts? No   Have you ever had problems with anemia or been told to take iron pills? No   Have you had any abnormal blood loss such as black, tarry or bloody stools, or abnormal vaginal bleeding? No   Have you ever had a blood transfusion? No   Are you willing to have a blood transfusion if it is medically needed before, during, or after your surgery? Yes   Have you or any of your relatives ever had problems with anesthesia? (!) YES   Get's sick when patient comes out of anesthesia.   Do you have sleep apnea, excessive snoring or daytime drowsiness? No   Do you have any artifical heart valves or other implanted medical devices like a pacemaker, defibrillator, or continuous glucose monitor? No   Do you have artificial joints? No   Are you allergic to latex? No     Health Care Directive  Patient does not have a Health Care Directive: Discussed advance care planning with patient; however, patient declined at this time.    Preoperative Review of    reviewed - controlled substances reflected in medication  list.      Status of Chronic Conditions:  See problem list for active medical problems.  Problems all longstanding and stable, except as noted/documented.  See ROS for pertinent symptoms related to these conditions.    Chronic pain syndrome - Followed by [facility].  Chronic and widespread pain.    Patient Active Problem List    Diagnosis Date Noted    Family history of malignant neoplasm of breast 05/18/2016     Priority: Medium    Ovarian cyst, right 10/10/2015     Priority: Medium    Pain in limb 03/26/2015     Priority: Medium    Other complications due to other internal orthopedic device, implant, and graft 03/26/2015     Priority: Medium    Lump or mass in breast 03/26/2015     Priority: Medium    Dyspepsia and other specified disorders of function of stomach 03/26/2015     Priority: Medium    Other specified dermatoses 03/26/2015     Priority: Medium    Lump of breast 07/15/2014     Priority: Medium     Bilateral, mammography pending      Moderate major depression (H) 08/19/2013     Priority: Medium    Back pain 06/08/2012     Priority: Medium     [facility] in [facility] Dr. Yuen      Basal cell carcinoma 05/10/2011     Priority: Medium     Left arm 2010      Hyperhidrosis 05/10/2011     Priority: Medium    GERD (gastroesophageal reflux disease) 05/10/2011     Priority: Medium    CARDIOVASCULAR SCREENING; LDL GOAL LESS THAN 160 02/10/2010     Priority: Medium     [place] 10-year CHD Risk Score: 1% (8 Total Points)   Values used to calculate score:     Age: 41 years -- Points: 0     Total Cholesterol: 223 mg/dL -- Points: 6     HDL Cholesterol: 52 mg/dL -- Points: 0     Systolic BP (untreated): 134 mmHg -- Points: 2    The patient is not a smoker. -- Points: 0    The patient has not been diagnosed with diabetes. -- Points: 0    The patient does not have a family history of CHD. -- Points:0        Personal History of Anorexia Nervosa 09/24/2004     Priority: Medium      Past Medical  History:   Diagnosis Date    Anorexia nervosa (H) 1996    hospitalized     Arthritis     ddd lumbar back    Depressive disorder, not elsewhere classified     sees psychiatry, Dr. Solis    Endometriosis 2007    hysterectomy 2008    Mild or unspecified pre-eclampsia, with delivery     Myalgia and myositis, unspecified     has seen PT    PONV (postoperative nausea and vomiting)     occas n/v postop     Past Surgical History:   Procedure Laterality Date    CARPAL TUNNEL RELEASE RT/LT  8/09, 10/09    CERVICECTOMY (TRACHELECTOMY)  6/15/11    HERNIA REPAIR      umbilical hernia    HYSTERECTOMY SUPRACERVICAL  2008    HYSTERECTOMY, PAP STILL INDICATED  2009    LSH; supracervical hysterectomy     INJECT STEROID (LOCATION) Bilateral 3/30/2015    Procedure: INJECT STEROID (LOCATION);  Surgeon: Laura Jasso DPM, Pod;  Location: [facility] OR    INSERT STIMULATOR AND LEADS INTERNAL DORSAL COLUMN N/A 10/13/2016    Procedure: INSERT STIMULATOR AND LEADS INTERNAL DORSAL COLUMN;  Surgeon: Del Yuen MD;  Location: [facility] OR    REMOVE HARDWARE FOOT Right 3/30/2015    Procedure: REMOVE HARDWARE FOOT;  Surgeon: Laura Jasso DPTAHIRA, Pod;  Location: [facility] OR     Current Outpatient Medications   Medication Sig Dispense Refill    HYDROcodone-acetaminophen (NORCO)  MG per tablet Take 1-2 tablets by mouth every 6 hours as needed (max 6 tabs/day)      magnesium hydroxide (MILK OF MAGNESIA) 400 MG/5ML suspension Take 30-60 mLs by mouth daily as needed for constipation or heartburn 360 mL 1    metaxalone (SKELAXIN) 800 MG tablet Take 800 mg by mouth 4 times daily as needed   1    naproxen (EC-NAPROSYN) 500 MG TBEC       OXYCONTIN 30 MG 12 hr tablet Take 20 mg by mouth every 12 hours.  0    UBRELVY 100 MG tablet TAKE 1 TABLET BY MOUTH ONCE MAY REPEAT DOSE ONCE AFTER 2 HRS IF NEEDED, MAX=200 MG IN 24 HRS      AMITIZA 24 MCG capsule TAKE 1 CAPSULE BY ORAL ROUTE 2 TIMES EVERY DAY WITH FOOD AND WATER (Patient not taking: Reported on 3/31/2025)  1     Cholecalciferol (VITAMIN D3) 10 MCG (400 UNIT) CAPS  (Patient not taking: Reported on 3/31/2025)      glycopyrrolate 2 MG TABS TAKE 2-3 MG BY MOUTH 2 TIMES DAILY AS NEEDED (Patient not taking: Reported on 3/31/2025) 60 tablet 0    ORDER FOR DME Equipment being ordered: wrist / thumb LT; [id number] (Patient not taking: Reported on 3/31/2025) 1 Device 0    ORDER FOR DME Equipment being ordered: umang mesa MD, Prostep; [id number] (Patient not taking: Reported on 3/31/2025) 1 Device 0    senna-docusate (SENOKOT-S;PERICOLACE) 8.6-50 MG per tablet Take 1 tablet by mouth 2 times daily (Patient not taking: Reported on 3/31/2025) 60 tablet 1    topiramate (TOPAMAX) 50 MG tablet Take 50 mg by mouth 2 times daily (Patient not taking: Reported on 3/31/2025)      traZODone (DESYREL) 50 MG tablet Take  mg by mouth nightly as needed  (Patient not taking: Reported on 3/31/2025) 90 tablet 3       Allergies   Allergen Reactions    Levaquin Swelling     Wrist tendons and achilles tendon    Cimetidine      loss of muscle control face and fingers-tagamet    Ciprofloxacin     Erythromycin      vomiting    Morphine      migraines    Tagamet      Loss of muscle control face and fingers        Social History     Tobacco Use    Smoking status: Former     Current packs/day: 0.00     Average packs/day: 1.5 packs/day for 8.0 years (12.0 ttl pk-yrs)     Types: Cigarettes     Start date: 1992     Quit date: 2000     Years since quittin.2     Passive exposure: Past    Smokeless tobacco: Never   Substance Use Topics    Alcohol use: Yes     Alcohol/week: 0.0 standard drinks of alcohol     Comment: 1 glass wine per week     Family History   Adopted: Yes   Problem Relation Age of Onset    Depression Mother     Cancer Mother         age at diagnosis and type is unknown    Depression Father     Breast Cancer Maternal Grandmother 40         in 50s    Diabetes Maternal Grandfather     Depression Maternal Aunt     Breast Cancer  "Maternal Aunt 40         in 40s of unrelated causes     History   Drug Use No           Review of Systems  Constitutional, HEENT, cardiovascular, pulmonary, gi and gu systems are negative, except as otherwise noted.    Objective    /84 (BP Location: Right arm, Patient Position: Sitting, Cuff Size: Adult Regular)   Pulse 95   Temp 97.3  F (36.3  C) (Tympanic)   Resp 16   Ht 1.61 m (5' 3.39\")   Wt 68 kg (149 lb 14.4 oz)   LMP 2008 (Approximate)   SpO2 98%   BMI 26.23 kg/m     Estimated body mass index is 26.23 kg/m  as calculated from the following:    Height as of this encounter: 1.61 m (5' 3.39\").    Weight as of this encounter: 68 kg (149 lb 14.4 oz).    Physical Exam  GENERAL: alert and no distress  EYES: Eyes grossly normal to inspection, PERRL and conjunctivae and sclerae normal  HENT: ear canals and TM's normal, nose and mouth without ulcers or lesions  NECK: no adenopathy, no asymmetry, masses, or scars  RESP: lungs clear to auscultation - no rales, rhonchi or wheezes  CV: regular rate and rhythm, normal S1 S2, no S3 or S4, no murmur, click or rub, no peripheral edema  ABDOMEN: soft, nontender, no hepatosplenomegaly, no masses and bowel sounds normal  MS:  Left leg in a soft cast.  SKIN: no suspicious lesions or rashes  NEURO: Normal strength and tone, mentation intact and speech normal  PSYCH: mentation appears normal, affect normal/bright    No results for input(s): \"HGB\", \"PLT\", \"INR\", \"NA\", \"POTASSIUM\", \"CR\", \"A1C\" in the last 8760 hours.     Diagnostics  Recent Results (from the past 24 hours)   Basic metabolic panel  (Ca, Cl, CO2, Creat, Gluc, K, Na, BUN)    Collection Time: 25  2:02 PM   Result Value Ref Range    Sodium 139 135 - 145 mmol/L    Potassium 3.9 3.4 - 5.3 mmol/L    Chloride 103 98 - 107 mmol/L    Carbon Dioxide (CO2) 27 22 - 29 mmol/L    Anion Gap 9 7 - 15 mmol/L    Urea Nitrogen 11.1 6.0 - 20.0 mg/dL    Creatinine 0.74 0.51 - 0.95 mg/dL    GFR Estimate >90 >60 " mL/min/1.73m2    Calcium 9.3 8.8 - 10.4 mg/dL    Glucose 85 70 - 99 mg/dL   Hemoglobin    Collection Time: 03/31/25  2:02 PM   Result Value Ref Range    Hemoglobin 13.5 11.7 - 15.7 g/dL      No EKG required, no history of coronary heart disease, significant arrhythmia, peripheral arterial disease or other structural heart disease.    Revised Cardiac Risk Index (RCRI)  The patient has the following serious cardiovascular risks for perioperative complications:   - No serious cardiac risks = 0 points     RCRI Interpretation: 0 points: Class I (very low risk - 0.4% complication rate)         Signed Electronically by: Olya Medina NP  A copy of this evaluation report is provided to the requesting physician.        Answers submitted by the patient for this visit:  Patient Health Questionnaire (Submitted on 3/31/2025)  If you checked off any problems, how difficult have these problems made it for you to do your work, take care of things at home, or get along with other people?: Not difficult at all  PHQ9 TOTAL SCORE: 0

## 2025-04-10 ENCOUNTER — TRANSFERRED RECORDS (OUTPATIENT)
Dept: HEALTH INFORMATION MANAGEMENT | Facility: CLINIC | Age: 55
End: 2025-04-10
Payer: COMMERCIAL

## 2025-04-14 ENCOUNTER — HOSPITAL ENCOUNTER (EMERGENCY)
Facility: CLINIC | Age: 55
Discharge: HOME | End: 2025-04-14
Attending: STUDENT IN AN ORGANIZED HEALTH CARE EDUCATION/TRAINING PROGRAM | Admitting: STUDENT IN AN ORGANIZED HEALTH CARE EDUCATION/TRAINING PROGRAM
Payer: COMMERCIAL

## 2025-04-14 VITALS
DIASTOLIC BLOOD PRESSURE: 61 MMHG | BODY MASS INDEX: 25.69 KG/M2 | HEIGHT: 63 IN | SYSTOLIC BLOOD PRESSURE: 107 MMHG | OXYGEN SATURATION: 99 % | RESPIRATION RATE: 18 BRPM | HEART RATE: 98 BPM | TEMPERATURE: 97 F | WEIGHT: 145 LBS

## 2025-04-14 DIAGNOSIS — K56.41 FECAL IMPACTION (H): ICD-10-CM

## 2025-04-14 LAB
ANION GAP SERPL CALCULATED.3IONS-SCNC: 12 MMOL/L (ref 7–15)
ATRIAL RATE - MUSE: 93 BPM
BASOPHILS # BLD AUTO: 0.1 10E3/UL (ref 0–0.2)
BASOPHILS NFR BLD AUTO: 0 %
BUN SERPL-MCNC: 11.9 MG/DL (ref 6–20)
CALCIUM SERPL-MCNC: 9.6 MG/DL (ref 8.8–10.4)
CHLORIDE SERPL-SCNC: 106 MMOL/L (ref 98–107)
CREAT SERPL-MCNC: 0.69 MG/DL (ref 0.51–0.95)
DIASTOLIC BLOOD PRESSURE - MUSE: NORMAL MMHG
EGFRCR SERPLBLD CKD-EPI 2021: >90 ML/MIN/1.73M2
EOSINOPHIL # BLD AUTO: 0 10E3/UL (ref 0–0.7)
EOSINOPHIL NFR BLD AUTO: 0 %
ERYTHROCYTE [DISTWIDTH] IN BLOOD BY AUTOMATED COUNT: 12.9 % (ref 10–15)
GLUCOSE SERPL-MCNC: 96 MG/DL (ref 70–99)
HCO3 SERPL-SCNC: 24 MMOL/L (ref 22–29)
HCT VFR BLD AUTO: 42 % (ref 35–47)
HGB BLD-MCNC: 13.7 G/DL (ref 11.7–15.7)
HOLD SPECIMEN: NORMAL
IMM GRANULOCYTES # BLD: 0.1 10E3/UL
IMM GRANULOCYTES NFR BLD: 0 %
INTERPRETATION ECG - MUSE: NORMAL
LYMPHOCYTES # BLD AUTO: 0.7 10E3/UL (ref 0.8–5.3)
LYMPHOCYTES NFR BLD AUTO: 5 %
MCH RBC QN AUTO: 29.3 PG (ref 26.5–33)
MCHC RBC AUTO-ENTMCNC: 32.6 G/DL (ref 31.5–36.5)
MCV RBC AUTO: 90 FL (ref 78–100)
MONOCYTES # BLD AUTO: 0.4 10E3/UL (ref 0–1.3)
MONOCYTES NFR BLD AUTO: 3 %
NEUTROPHILS # BLD AUTO: 12.5 10E3/UL (ref 1.6–8.3)
NEUTROPHILS NFR BLD AUTO: 92 %
NRBC # BLD AUTO: 0 10E3/UL
NRBC BLD AUTO-RTO: 0 /100
P AXIS - MUSE: 47 DEGREES
PLATELET # BLD AUTO: 257 10E3/UL (ref 150–450)
POTASSIUM SERPL-SCNC: 4 MMOL/L (ref 3.4–5.3)
PR INTERVAL - MUSE: 124 MS
QRS DURATION - MUSE: 80 MS
QT - MUSE: 376 MS
QTC - MUSE: 467 MS
R AXIS - MUSE: -37 DEGREES
RBC # BLD AUTO: 4.68 10E6/UL (ref 3.8–5.2)
SODIUM SERPL-SCNC: 142 MMOL/L (ref 135–145)
SYSTOLIC BLOOD PRESSURE - MUSE: NORMAL MMHG
T AXIS - MUSE: 28 DEGREES
VENTRICULAR RATE- MUSE: 93 BPM
WBC # BLD AUTO: 13.7 10E3/UL (ref 4–11)

## 2025-04-14 PROCEDURE — 82435 ASSAY OF BLOOD CHLORIDE: CPT | Performed by: STUDENT IN AN ORGANIZED HEALTH CARE EDUCATION/TRAINING PROGRAM

## 2025-04-14 PROCEDURE — 85025 COMPLETE CBC W/AUTO DIFF WBC: CPT | Performed by: SOCIAL WORKER

## 2025-04-14 PROCEDURE — 80048 BASIC METABOLIC PNL TOTAL CA: CPT | Performed by: STUDENT IN AN ORGANIZED HEALTH CARE EDUCATION/TRAINING PROGRAM

## 2025-04-14 PROCEDURE — 99284 EMERGENCY DEPT VISIT MOD MDM: CPT

## 2025-04-14 PROCEDURE — 36415 COLL VENOUS BLD VENIPUNCTURE: CPT | Performed by: SOCIAL WORKER

## 2025-04-14 PROCEDURE — 85025 COMPLETE CBC W/AUTO DIFF WBC: CPT | Performed by: STUDENT IN AN ORGANIZED HEALTH CARE EDUCATION/TRAINING PROGRAM

## 2025-04-14 PROCEDURE — 93005 ELECTROCARDIOGRAM TRACING: CPT

## 2025-04-14 PROCEDURE — 250N000013 HC RX MED GY IP 250 OP 250 PS 637: Performed by: STUDENT IN AN ORGANIZED HEALTH CARE EDUCATION/TRAINING PROGRAM

## 2025-04-14 PROCEDURE — 82565 ASSAY OF CREATININE: CPT | Performed by: STUDENT IN AN ORGANIZED HEALTH CARE EDUCATION/TRAINING PROGRAM

## 2025-04-14 RX ORDER — MAGNESIUM CARB/ALUMINUM HYDROX 105-160MG
296 TABLET,CHEWABLE ORAL ONCE
Qty: 296 ML | Refills: 0 | Status: SHIPPED | OUTPATIENT
Start: 2025-04-14 | End: 2025-04-14

## 2025-04-14 RX ORDER — LIDOCAINE HYDROCHLORIDE 20 MG/ML
6 JELLY TOPICAL ONCE
Status: DISCONTINUED | OUTPATIENT
Start: 2025-04-14 | End: 2025-04-14 | Stop reason: HOSPADM

## 2025-04-14 RX ORDER — SENNOSIDES 8.6 MG
1 TABLET ORAL 2 TIMES DAILY
Qty: 60 TABLET | Refills: 0 | Status: SHIPPED | OUTPATIENT
Start: 2025-04-14

## 2025-04-14 RX ADMIN — SODIUM PHOSPHATE, DIBASIC AND SODIUM PHOSPHATE, MONOBASIC 226 ML: 3.5; 9.5 ENEMA RECTAL at 18:10

## 2025-04-14 ASSESSMENT — ACTIVITIES OF DAILY LIVING (ADL)
ADLS_ACUITY_SCORE: 41
ADLS_ACUITY_SCORE: 41

## 2025-04-14 ASSESSMENT — COLUMBIA-SUICIDE SEVERITY RATING SCALE - C-SSRS
2. HAVE YOU ACTUALLY HAD ANY THOUGHTS OF KILLING YOURSELF IN THE PAST MONTH?: NO
1. IN THE PAST MONTH, HAVE YOU WISHED YOU WERE DEAD OR WISHED YOU COULD GO TO SLEEP AND NOT WAKE UP?: NO
6. HAVE YOU EVER DONE ANYTHING, STARTED TO DO ANYTHING, OR PREPARED TO DO ANYTHING TO END YOUR LIFE?: NO

## 2025-04-14 NOTE — ED PROVIDER NOTES
Emergency Department Note      History of Present Illness     Chief Complaint   Constipation and Palpitations      HPI   Ella Reid is a 54 year old female with a history of chronic opioid use, chronic pain, who presents to the ED today for evaluation of constipation. The patient reports she started taking oxycodone after a recent left ankle surgery which has caused her to become constipated with 8/10 rectal pain. She states it feels like her rectum is stretched and nothing is coming out. She also endorses some abdominal pain and nausea, but no vomiting. She mentions she also takes Vicodin and OxyContin regularly for back pain. She has also recently been taking Colace and Miralax 3x a day for constipation. She also tried an at-home enema with no relief in symptoms.     Independent Historian   None    Review of External Notes   none    Past Medical History     Medical History and Problem List   Past Medical History:   Diagnosis Date    Anorexia nervosa (H) 1996    Arthritis     Depressive disorder, not elsewhere classified     Endometriosis 2007    Mild or unspecified pre-eclampsia, with delivery     Myalgia and myositis, unspecified     PONV (postoperative nausea and vomiting)        Medications   magnesium citrate 1.745 GM/30ML solution  sennosides (SENOKOT) 8.6 MG tablet  HYDROcodone-acetaminophen (NORCO)  MG per tablet  magnesium hydroxide (MILK OF MAGNESIA) 400 MG/5ML suspension  metaxalone (SKELAXIN) 800 MG tablet  naproxen (EC-NAPROSYN) 500 MG TBEC  OXYCONTIN 30 MG 12 hr tablet  UBRELVY 100 MG tablet        Surgical History   Past Surgical History:   Procedure Laterality Date    CARPAL TUNNEL RELEASE RT/LT  8/09, 10/09    CERVICECTOMY (TRACHELECTOMY)  6/15/11    HERNIA REPAIR      umbilical hernia    HYSTERECTOMY SUPRACERVICAL  2008    HYSTERECTOMY, PAP STILL INDICATED  2009    LSH; supracervical hysterectomy     INJECT STEROID (LOCATION) Bilateral 3/30/2015    Procedure: INJECT STEROID  "(LOCATION);  Surgeon: Laura Jasso DPM, Pod;  Location: [facility] OR    INSERT STIMULATOR AND LEADS INTERNAL DORSAL COLUMN N/A 10/13/2016    Procedure: INSERT STIMULATOR AND LEADS INTERNAL DORSAL COLUMN;  Surgeon: Del Yuen MD;  Location: [facility] OR    REMOVE HARDWARE FOOT Right 3/30/2015    Procedure: REMOVE HARDWARE FOOT;  Surgeon: Laura Jasso, RODOLFO, Pod;  Location: [facility] OR       Physical Exam     Patient Vitals for the past 24 hrs:   BP Temp Temp src Pulse Resp SpO2 Height Weight   04/14/25 1905 107/61 -- -- 98 18 99 % -- --   04/14/25 1446 (!) 121/100 97  F (36.1  C) Temporal (!) 121 18 99 % 1.6 m (5' 3\") 65.8 kg (145 lb)     Physical Exam  GENERAL: Patient appears uncomfortable  HEAD: Atraumatic.  NECK: No rigidity  CV: RRR, no murmurs, rubs or gallops  PULM: CTAB with good aeration; no retractions, rales, rhonchi, or wheezing  ABD: Soft, nontender, nondistended, no guarding  DERM: No rash. Skin warm and dry  EXTREMITY: Left leg in cast  Rectal: Female chaperone present large amount of hard stool disimpacted in the rectal vault.  Diagnostics     Lab Results   Labs Ordered and Resulted from Time of ED Arrival to Time of ED Departure   CBC WITH PLATELETS AND DIFFERENTIAL - Abnormal       Result Value    WBC Count 13.7 (*)     RBC Count 4.68      Hemoglobin 13.7      Hematocrit 42.0      MCV 90      MCH 29.3      MCHC 32.6      RDW 12.9      Platelet Count 257      % Neutrophils 92      % Lymphocytes 5      % Monocytes 3      % Eosinophils 0      % Basophils 0      % Immature Granulocytes 0      NRBCs per 100 WBC 0      Absolute Neutrophils 12.5 (*)     Absolute Lymphocytes 0.7 (*)     Absolute Monocytes 0.4      Absolute Eosinophils 0.0      Absolute Basophils 0.1      Absolute Immature Granulocytes 0.1      Absolute NRBCs 0.0     BASIC METABOLIC PANEL - Normal    Sodium 142      Potassium 4.0      Chloride 106      Carbon Dioxide (CO2) 24      Anion Gap 12      Urea Nitrogen 11.9      Creatinine 0.69      GFR " Estimate >90      Calcium 9.6      Glucose 96       Imaging   No orders to display     EKG   ECG results from 04/14/25   EKG 12-lead, tracing only     Value    Systolic Blood Pressure     Diastolic Blood Pressure     Ventricular Rate 93    Atrial Rate 93    AL Interval 124    QRS Duration 80        QTc 467    P Axis 47    R AXIS -37    T Axis 28    Interpretation ECG      Sinus rhythm  Left axis deviation  Low voltage QRS  Abnormal ECG  No previous ECGs available  Unconfirmed report - interpretation of this ECG is computer generated - see medical record for final interpretation  Confirmed by - EMERGENCY ROOM, PHYSICIAN ([id number]),  Niranjan Escamilla ([id number]) on 4/14/2025 3:14:21 PM     EKG read by Dr. Gomez at 1745.    Independent Interpretation   None    ED Course      Medications Administered   Medications   lidocaine (XYLOCAINE) 2 % external gel 6 mL (has no administration in time range)   Enema Compound (docusate/mineral oil/NaPhos) NO MAG CIT PREMIX (226 mLs Rectal $Given 4/14/25 1810)     Procedures   Procedures       Discussion of Management   None    ED Course   ED Course as of 04/14/25 1946 Mon Apr 14, 2025 1739 I obtained history and examined the patient as noted above.    1844 I rechecked and updated the patient. I manually disimpacted the patient, as the enema did not relieve symptoms.        Additional Documentation  None    Medical Decision Making / Diagnosis     CMS Diagnoses: None    MIPS       None    MDM   Ella Reid is a 54 year old female     Symptoms concerning for fecal impaction.  Chronic conditions complicating - constipation and on chronic narcotics.  DDx considered obstruction, metabolic abnormalities, mass, among others.  Rectal exam -hard stool in rectal vault.  Consented patient for manual disimpaction and significant amount of yasir textured stool was removed.  Labs were ordered at triage.  She has a leukocytosis but I do not suspect infection.  Vital signs improved after  disimpaction.  Upon repeat assessment she is feeling much improved and ready to go home.    Recommend she continue MiraLAX 3 times a day.  Recommend she buy fiber supplement.  Prescribed magnesium citrate and twice daily senna.    I have evaluated the patient for acute medical emergencies and have clinically decided no further acute medical interventions are required. Patient stable for discharge. All questions answered. Given strict return precautions. Patient content with plan. The differential diagnosis and treatment modalities were discussed thoroughly with the patient. Recommended PCP follow-up in 2-3 days.  Recommend she talk to her doctor regarding decreasing her narcotic usage.    Disposition   The patient was discharged.     Diagnosis     ICD-10-CM    1. Fecal impaction (H)  K56.41            Discharge Medications   Discharge Medication List as of 4/14/2025  7:27 PM        START taking these medications    Details   magnesium citrate 1.745 GM/30ML solution Take 296 mLs by mouth once for 1 dose., Disp-296 mL, R-0, E-Prescribe      sennosides (SENOKOT) 8.6 MG tablet Take 1 tablet by mouth 2 times daily., Disp-60 tablet, R-0, E-Prescribe               Scribe Disclosure:  I, Cassidy Leonard, am serving as a scribe at 7:43 PM on 4/14/2025 to document services personally performed by Hal Gomez MD based on my observations and the provider's statements to me.        Hal Gomez MD  04/14/25 2022

## 2025-04-14 NOTE — ED TRIAGE NOTES
Patient reports constipation and palpitations.  She reports last regular BM about 1 week ago.  Recent ankle surgery.  She also reports palpitations.  ABCs intact, A&O     Triage Assessment (Adult)       Row Name 04/14/25 1444          Triage Assessment    Airway WDL WDL        Respiratory WDL    Respiratory WDL WDL        Skin Circulation/Temperature WDL    Skin Circulation/Temperature WDL WDL        Cardiac WDL    Cardiac WDL WDL        Peripheral/Neurovascular WDL    Peripheral Neurovascular WDL WDL        Cognitive/Neuro/Behavioral WDL    Cognitive/Neuro/Behavioral WDL WDL

## 2025-04-15 NOTE — DISCHARGE INSTRUCTIONS
Return to the emergency department if symptoms are worsening, become concerning, or for any other concerns. Follow-up with your doctor in 2-3 days and sooner if needed.    Discharge Instructions  Constipation  Constipation can cause severe cramping pain and your provider thinks this might be the cause of your abdominal pain (belly pain) today.  People usually recognize that they are constipated because they have difficulty having bowel movements, are not having bowel movements frequently enough, or are not having large enough bowel movements. Sometimes, especially in children or older people, you do not recognize that you are constipated until it becomes severe. The most common causes of constipation are a lack of exercise and not eating enough fruits, vegetables, and whole grains. Constipation can also be a side effect of medications, such as narcotics, or may be caused by a disease of the digestive system.    Generally, every Emergency Department visit should have a follow-up clinic visit with either a primary or a specialty clinic/provider. Please follow-up as instructed by your emergency provider today. Sometimes, chronic constipation requires further testing to determine the cause. If you are over 50 years old, you may need a colonoscopy if you have not had one before.     Return to the Emergency Department if:  Your abdominal pain worsens or does not improve after a bowel movement.  You become very weak.  You get a temperature above 102oF or as directed by your provider.  You have blood in your stools (bright red or black, tarry stools).  You keep vomiting (throwing up) or cannot drink liquids.  Your see blood when you vomit.  Your stomach gets bloated or bigger.  You have new symptoms or anything that worries you.    What can I do to help myself?  If your provider gave you a cathartic medication, like magnesium citrate or GoLytely  (polyethylene glycol), you can expect to have cramps and gas pains after  taking it. You can expect to have a number of bowel movements and even diarrhea (loose or watery stools) in the course of clearing your bowels.  You will know your bowels have been cleaned out after you pass clear liquid. The cramps and gas should let up after you have emptied your bowels. You may want to wait until morning to take this type of medication so you aren t up in the night.   Sometimes instead of cathartics, we recommend laxatives like milk of magnesia to move your bowels more slowly, or an enema to help the bowels to move. Read and follow the package directions, or follow your provider s instructions.  Once you have become very constipated, it takes time for your bowels to return to normal and you need to be very careful to prevent becoming constipated again. Take a laxative if you do not move your bowels at least every two days.     Eat foods that have a lot of fiber. Good choices are fruits, vegetables, prune juice, apple juice, and high fiber cereal. Limit dairy products such as milk and cheese, since these can make constipation worse.   Drink plenty of water.   When you feel the need to go to the bathroom, go to the bathroom. Do not hold it.  Miralax , Metamucil , Colace , Senna or fiber supplements can be used daily.  Miralax  daily is often the best choice for children.  If you were given a prescription for medicine here today, be sure to read all of the information (including the package insert) that comes with your prescription.  This will include important information about the medicine, its side effects, and any warnings that you need to know about.  The pharmacist who fills the prescription can provide more information and answer questions you may have about the medicine.  If you have questions or concerns that the pharmacist cannot address, please call or return to the Emergency Department.   Remember that you can always come back to the Emergency Department if you are not able to see your  regular provider in the amount of time listed above, if you get any new symptoms, or if there is anything that worries you.

## 2025-04-17 ENCOUNTER — TRANSFERRED RECORDS (OUTPATIENT)
Dept: HEALTH INFORMATION MANAGEMENT | Facility: CLINIC | Age: 55
End: 2025-04-17
Payer: COMMERCIAL

## 2025-05-09 ENCOUNTER — TRANSFERRED RECORDS (OUTPATIENT)
Dept: HEALTH INFORMATION MANAGEMENT | Facility: CLINIC | Age: 55
End: 2025-05-09
Payer: COMMERCIAL

## 2025-05-15 ENCOUNTER — TRANSFERRED RECORDS (OUTPATIENT)
Dept: HEALTH INFORMATION MANAGEMENT | Facility: CLINIC | Age: 55
End: 2025-05-15
Payer: COMMERCIAL

## 2025-06-10 ENCOUNTER — OFFICE VISIT (OUTPATIENT)
Dept: PEDIATRICS | Facility: CLINIC | Age: 55
End: 2025-06-10
Payer: COMMERCIAL

## 2025-06-10 ENCOUNTER — TRANSFERRED RECORDS (OUTPATIENT)
Dept: HEALTH INFORMATION MANAGEMENT | Facility: CLINIC | Age: 55
End: 2025-06-10

## 2025-06-10 VITALS
DIASTOLIC BLOOD PRESSURE: 89 MMHG | HEART RATE: 108 BPM | WEIGHT: 151.4 LBS | BODY MASS INDEX: 26.82 KG/M2 | HEIGHT: 63 IN | OXYGEN SATURATION: 99 % | RESPIRATION RATE: 16 BRPM | TEMPERATURE: 97.5 F | SYSTOLIC BLOOD PRESSURE: 127 MMHG

## 2025-06-10 DIAGNOSIS — G89.29 CHRONIC LOW BACK PAIN, UNSPECIFIED BACK PAIN LATERALITY, UNSPECIFIED WHETHER SCIATICA PRESENT: ICD-10-CM

## 2025-06-10 DIAGNOSIS — Z01.818 PREOP GENERAL PHYSICAL EXAM: Primary | ICD-10-CM

## 2025-06-10 DIAGNOSIS — M54.50 CHRONIC LOW BACK PAIN, UNSPECIFIED BACK PAIN LATERALITY, UNSPECIFIED WHETHER SCIATICA PRESENT: ICD-10-CM

## 2025-06-10 PROCEDURE — 87081 CULTURE SCREEN ONLY: CPT | Performed by: PHYSICIAN ASSISTANT

## 2025-06-10 PROCEDURE — 99214 OFFICE O/P EST MOD 30 MIN: CPT | Performed by: PHYSICIAN ASSISTANT

## 2025-06-10 PROCEDURE — 3074F SYST BP LT 130 MM HG: CPT | Performed by: PHYSICIAN ASSISTANT

## 2025-06-10 PROCEDURE — 3079F DIAST BP 80-89 MM HG: CPT | Performed by: PHYSICIAN ASSISTANT

## 2025-06-10 PROCEDURE — 1125F AMNT PAIN NOTED PAIN PRSNT: CPT | Performed by: PHYSICIAN ASSISTANT

## 2025-06-10 ASSESSMENT — PAIN SCALES - GENERAL: PAINLEVEL_OUTOF10: SEVERE PAIN (7)

## 2025-06-10 NOTE — PROGRESS NOTES
Preoperative Evaluation  [facility] [place]  [address]  [address]  [place] [address]  Phone: [phone]  Fax: [phone]  Primary Provider: Physician No Ref-Primary  Pre-op Performing Provider: Zi Hoang PA-C  Cheko 10, 2025         6/10/2025   Surgical Information   What procedure is being done? surgery to replace scs battery   Facility or Hospital where procedure/surgery will be performed: [facility]   Who is doing the procedure / surgery? Dr Nevarez   Date of surgery / procedure: June 24th 2025   Time of surgery / procedure: 8am   Where do you plan to recover after surgery? at home with family     Fax number for surgical facility: [phone]    Assessment & Plan     The proposed surgical procedure is considered INTERMEDIATE risk.    Preop general physical exam  - Methicillin resistant staph aureus cult    Chronic low back pain, unspecified back pain laterality, unspecified whether sciatica present     - No identified additional risk factors other than previously addressed    Recommendation  Approval given to proceed with proposed procedure, without further diagnostic evaluation.    Kate Verdugo is a 54 year old, presenting for the following:  Pre-Op Exam  HPI: spinal stimulator that needs to be replaced.           6/10/2025   Pre-Op Questionnaire   Have you ever had a heart attack or stroke? No   Have you ever had surgery on your heart or blood vessels, such as a stent placement, a coronary artery bypass, or surgery on an artery in your head, neck, heart, or legs? No   Do you have chest pain with activity? No   Do you have a history of heart failure? No   Do you currently have a cold, bronchitis or symptoms of other infection? No   Do you have a cough, shortness of breath, or wheezing? No   Do you or anyone in your family have previous history of blood clots? No   Do you or does anyone in your family have a  serious bleeding problem such as prolonged bleeding following surgeries or cuts? No   Have you ever had problems with anemia or been told to take iron pills? No   Have you had any abnormal blood loss such as black, tarry or bloody stools, or abnormal vaginal bleeding? No   Have you ever had a blood transfusion? No   Are you willing to have a blood transfusion if it is medically needed before, during, or after your surgery? Yes   Have you or any of your relatives ever had problems with anesthesia? (!) YES --severe vomiting post anesthesia   Do you have sleep apnea, excessive snoring or daytime drowsiness? No   Do you have any artifical heart valves or other implanted medical devices like a pacemaker, defibrillator, or continuous glucose monitor? No   Do you have artificial joints? No   Are you allergic to latex? No     Patient Active Problem List    Diagnosis Date Noted    Family history of malignant neoplasm of breast 05/18/2016     Priority: Medium    Ovarian cyst, right 10/10/2015     Priority: Medium    Pain in limb 03/26/2015     Priority: Medium    Other complications due to other internal orthopedic device, implant, and graft 03/26/2015     Priority: Medium    Lump or mass in breast 03/26/2015     Priority: Medium    Dyspepsia and other specified disorders of function of stomach 03/26/2015     Priority: Medium    Other specified dermatoses 03/26/2015     Priority: Medium    Lump of breast 07/15/2014     Priority: Medium     Bilateral, mammography pending      Moderate major depression (H) 08/19/2013     Priority: Medium    Back pain 06/08/2012     Priority: Medium     [facility] pain clinic in [place] - Dr. Yuen      Basal cell carcinoma 05/10/2011     Priority: Medium     Left arm 2010      Hyperhidrosis 05/10/2011     Priority: Medium    GERD (gastroesophageal reflux disease) 05/10/2011     Priority: Medium    CARDIOVASCULAR SCREENING; LDL GOAL LESS THAN 160 02/10/2010     Priority: Medium     [place]  10-year CHD Risk Score: 1% (8 Total Points)   Values used to calculate score:     Age: 41 years -- Points: 0     Total Cholesterol: 223 mg/dL -- Points: 6     HDL Cholesterol: 52 mg/dL -- Points: 0     Systolic BP (untreated): 134 mmHg -- Points: 2    The patient is not a smoker. -- Points: 0    The patient has not been diagnosed with diabetes. -- Points: 0    The patient does not have a family history of CHD. -- Points:0        Personal History of Anorexia Nervosa 09/24/2004     Priority: Medium      Past Medical History:   Diagnosis Date    Anorexia nervosa (H) 1996    hospitalized     Arthritis     ddd lumbar back    Depressive disorder, not elsewhere classified     sees psychiatry, Dr. Solis    Endometriosis 2007    hysterectomy 2008    Mild or unspecified pre-eclampsia, with delivery     Myalgia and myositis, unspecified     has seen PT    PONV (postoperative nausea and vomiting)     occas n/v postop     Past Surgical History:   Procedure Laterality Date    ANKLE FRACTURE SURGERY  2025    CARPAL TUNNEL RELEASE RT/LT  8/09, 10/09    2009    CERVICECTOMY (TRACHELECTOMY)  06/15/2011    HERNIA REPAIR  2000    umbilical hernia    HYSTERECTOMY SUPRACERVICAL  01/01/2008    HYSTERECTOMY, PAP STILL INDICATED  01/01/2009    LSH; supracervical hysterectomy     INJECT STEROID (LOCATION) Bilateral 03/30/2015    Procedure: INJECT STEROID (LOCATION);  Surgeon: Laura Jasso DPM, Pod;  Location:  OR    INSERT STIMULATOR AND LEADS INTERNAL DORSAL COLUMN N/A 10/13/2016    Procedure: INSERT STIMULATOR AND LEADS INTERNAL DORSAL COLUMN;  Surgeon: Del Yuen MD;  Location:  OR    REMOVE HARDWARE FOOT Right 03/30/2015    Procedure: REMOVE HARDWARE FOOT;  Surgeon: Laura Jasso DPM, Pod;  Location: [facility] OR     Current Outpatient Medications   Medication Sig Dispense Refill    HYDROcodone-acetaminophen (NORCO)  MG per tablet Take 1-2 tablets by mouth every 6 hours as needed (max 6 tabs/day)      magnesium hydroxide  "(MILK OF MAGNESIA) 400 MG/5ML suspension Take 30-60 mLs by mouth daily as needed for constipation or heartburn 360 mL 1    metaxalone (SKELAXIN) 800 MG tablet Take 800 mg by mouth 4 times daily as needed   1    naproxen (EC-NAPROSYN) 500 MG TBEC       OXYCONTIN 30 MG 12 hr tablet Take 20 mg by mouth every 12 hours.  0    sennosides (SENOKOT) 8.6 MG tablet Take 1 tablet by mouth 2 times daily. 60 tablet 0    UBRELVY 100 MG tablet TAKE 1 TABLET BY MOUTH ONCE MAY REPEAT DOSE ONCE AFTER 2 HRS IF NEEDED, MAX=200 MG IN 24 HRS         Allergies   Allergen Reactions    Levaquin Swelling     Wrist tendons and achilles tendon    Cimetidine      loss of muscle control face and fingers-tagamet    Ciprofloxacin      tendonitis    Erythromycin      vomiting    Morphine      migraines    Tagamet      Loss of muscle control face and fingers        Social History     Tobacco Use    Smoking status: Former     Current packs/day: 0.00     Average packs/day: 1.5 packs/day for 8.0 years (12.0 ttl pk-yrs)     Types: Cigarettes     Start date: 1992     Quit date: 2000     Years since quittin.4     Passive exposure: Past    Smokeless tobacco: Never   Substance Use Topics    Alcohol use: Yes     Alcohol/week: 0.0 standard drinks of alcohol     Comment: 1 glass wine per week     History   Drug Use No       Review of Systems  Constitutional, neuro, ENT, endocrine, pulmonary, cardiac, gastrointestinal, genitourinary, musculoskeletal, integument and psychiatric systems are negative, except as otherwise noted.    Objective    /89 (BP Location: Right arm, Patient Position: Sitting, Cuff Size: Adult Large)   Pulse 108   Temp 97.5  F (36.4  C) (Temporal)   Resp 16   Ht 1.6 m (5' 3\")   Wt 68.7 kg (151 lb 6.4 oz)   LMP 2008 (Approximate)   SpO2 99%   BMI 26.82 kg/m     Estimated body mass index is 26.82 kg/m  as calculated from the following:    Height as of this encounter: 1.6 m (5' 3\").    Weight as of this " encounter: 68.7 kg (151 lb 6.4 oz).  Physical Exam  GENERAL: alert and no distress  EYES: Eyes grossly normal to inspection, PERRL and conjunctivae and sclerae normal  HENT: ear canals and TM's normal, nose and mouth without ulcers or lesions  NECK: no adenopathy  RESP: lungs clear to auscultation - no rales, rhonchi or wheezes  CV: regular rate and rhythm, normal S1 S2, no S3 or S4, no murmur  ABDOMEN: soft, nontender, no hepatosplenomegaly  MS: no gross musculoskeletal defects noted, no edema    Recent Labs   Lab Test 04/14/25  1624 03/31/25  1402   HGB 13.7 13.5     --     139   POTASSIUM 4.0 3.9   CR 0.69 0.74        Diagnostics  Labs pending at this time.  Results will be reviewed when available.   No EKG required, no history of coronary heart disease, significant arrhythmia, peripheral arterial disease or other structural heart disease.    Revised Cardiac Risk Index (RCRI)  The patient has the following serious cardiovascular risks for perioperative complications:   - No serious cardiac risks = 0 points     RCRI Interpretation: 0 points: Class I (very low risk - 0.4% complication rate)         Signed Electronically by: Zi Hoang PA-C  A copy of this evaluation report is provided to the requesting physician.

## 2025-06-12 LAB — BACTERIA SPEC CULT: NORMAL

## 2025-06-13 ENCOUNTER — RESULTS FOLLOW-UP (OUTPATIENT)
Dept: PEDIATRICS | Facility: CLINIC | Age: 55
End: 2025-06-13

## 2025-06-14 ENCOUNTER — HEALTH MAINTENANCE LETTER (OUTPATIENT)
Age: 55
End: 2025-06-14

## 2025-07-01 ENCOUNTER — TRANSFERRED RECORDS (OUTPATIENT)
Dept: HEALTH INFORMATION MANAGEMENT | Facility: CLINIC | Age: 55
End: 2025-07-01
Payer: COMMERCIAL

## 2025-08-12 ENCOUNTER — TRANSFERRED RECORDS (OUTPATIENT)
Dept: HEALTH INFORMATION MANAGEMENT | Facility: CLINIC | Age: 55
End: 2025-08-12
Payer: COMMERCIAL